# Patient Record
Sex: FEMALE | Race: WHITE
[De-identification: names, ages, dates, MRNs, and addresses within clinical notes are randomized per-mention and may not be internally consistent; named-entity substitution may affect disease eponyms.]

---

## 2018-11-05 ENCOUNTER — HOSPITAL ENCOUNTER (EMERGENCY)
Dept: HOSPITAL 25 - UCCORT | Age: 23
Discharge: HOME | End: 2018-11-05
Payer: COMMERCIAL

## 2018-11-05 VITALS — DIASTOLIC BLOOD PRESSURE: 87 MMHG | SYSTOLIC BLOOD PRESSURE: 127 MMHG

## 2018-11-05 DIAGNOSIS — J32.9: Primary | ICD-10-CM

## 2018-11-05 PROCEDURE — 87651 STREP A DNA AMP PROBE: CPT

## 2018-11-05 PROCEDURE — G0463 HOSPITAL OUTPT CLINIC VISIT: HCPCS

## 2018-11-05 PROCEDURE — 99202 OFFICE O/P NEW SF 15 MIN: CPT

## 2018-11-05 NOTE — ED
Throat Pain/Nasal Congestion





- HPI Summary


HPI Summary: 





23 yr old female with the complaint of coughing for the past couple of weeks. 

She has also had nasal congestion, and now sore throat, post nasal drip, and 

over the weekend frontal sinus pressure and ache.  She denies SOB, Asthma.   





- History of Current Complaint


Chief Complaint: UCGeneralIllness


Time Seen by Provider: 11/05/18 10:47





- Allergies/Home Medications


Allergies/Adverse Reactions: 


 Allergies











Allergy/AdvReac Type Severity Reaction Status Date / Time


 


No Known Allergies Allergy   Verified 11/05/18 10:38











Home Medications: 


 Home Medications





Dm/Acetaminophen/Doxylamine [Night Cold-Flu Relief Liq Gel] 1 each PO BEDTIME 11 /05/18 [History Confirmed 11/05/18]


Norgestimate-Ethinyl Estradiol [Ortho Tri-Cyclen Lo Tablet] 1 each PO DAILY 11/ 05/18 [History Confirmed 11/05/18]











PMH/Surg Hx/FS Hx/Imm Hx





- Surgical History


Surgery Procedure, Year, and Place: WISDOM TEETH REMOVAL


Infectious Disease History: No


Infectious Disease History: 


   Denies: Traveled Outside the [place] in Last 30 Days





- Social History


Alcohol Use: Weekly


Substance Use Type: Reports: None


Smoking Status (MU): Never Smoked Tobacco





Review of Systems


Constitutional: Negative


Positive: Nasal Discharge, Other - sinus pressure


Positive: Cough


All Other Systems Reviewed And Are Negative: Yes





Physical Exam


Triage Information Reviewed: Yes


Vital Signs On Initial Exam: 


 Initial Vitals











Temp Pulse Resp BP Pulse Ox


 


 98.3 F   88   15   127/87   98 


 


 11/05/18 10:35  11/05/18 10:35  11/05/18 10:35  11/05/18 10:35  11/05/18 10:35











Vital Signs Reviewed: Yes


Appearance: Positive: Well-Appearing, No Pain Distress


Skin: Positive: Warm


Eyes: Positive: EOMI


ENT: Positive: Normal ENT inspection, Pharyngeal erythema, Nasal congestion, 

Nasal drainage, TMs normal, Sinus tenderness


Neck: Positive: Nontender


Respiratory/Lung Sounds: Positive: Clear to Auscultation, Breath Sounds Present


Cardiovascular: Positive: RRR.  Negative: Murmur


Abdomen Description: Positive: Nontender


Musculoskeletal: Positive: Strength/ROM Intact


Neurological: Positive: Sensory/Motor Intact, Alert, Oriented to Person Place, 

Time, CN Intact II-III


Psychiatric: Positive: Normal





- Cody Coma Scale


Best Eye Response: 4 - Spontaneous


Best Motor Response: 6 - Obeys Commands


Best Verbal Response: 5 - Oriented


Coma Scale Total: 15





Diagnostics





- Vital Signs


 Vital Signs











  Temp Pulse Resp BP Pulse Ox


 


 11/05/18 10:35  98.3 F  88  15  127/87  98














- Laboratory


Lab Results: 


 Lab Results











  11/05/18 Range/Units





  11:02 


 


Group A Strep Rapid  Negative  (Negative)  











Lab Statement: Any lab studies that have been ordered have been reviewed, and 

results considered in the medical decision making process.





EENT Course/Dx





- Course


Course Of Treatment: 23 yr old with sinusitis.  Rx Biaxin.





- Diagnoses


Provider Diagnoses: 


 Sinusitis








Discharge





- Sign-Out/Discharge


Documenting (check all that apply): Patient Departure


All imaging exams completed and their final reports reviewed: No Studies





- Discharge Plan


Condition: Good


Disposition: HOME


Prescriptions: 


Clarithromycin TAB* [Biaxin 500 MG TAB*] 500 mg PO BID #20 tab


Patient Education Materials:  Sinusitis (ED)


Referrals: 


No Primary Care Phys,NOPCP [Primary Care Provider] - 


[facility] [Outside]





- Billing Disposition and Condition


Condition: GOOD


Disposition: Home